# Patient Record
Sex: FEMALE | Race: WHITE
[De-identification: names, ages, dates, MRNs, and addresses within clinical notes are randomized per-mention and may not be internally consistent; named-entity substitution may affect disease eponyms.]

---

## 2017-06-14 ENCOUNTER — HOSPITAL ENCOUNTER (OUTPATIENT)
Dept: HOSPITAL 10 - HKI | Age: 82
Discharge: HOME | End: 2017-06-14
Attending: ORTHOPAEDIC SURGERY
Payer: MEDICARE

## 2017-06-14 DIAGNOSIS — M16.12: Primary | ICD-10-CM

## 2017-06-14 PROCEDURE — G0463 HOSPITAL OUTPT CLINIC VISIT: HCPCS

## 2017-06-14 PROCEDURE — 20610 DRAIN/INJ JOINT/BURSA W/O US: CPT

## 2017-06-14 NOTE — HKNOTE
DATE OF SERVICE:  06/14/2017

 

 

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  She has previously had bilateral knee 
replacements.  The pain in her left groin is "now quite severe."  She has to advance to "events" to 
attend, 1 in July and 1 in September.  She requests cortisone injections.

 

PHYSICAL EXAMINATION:

GENERAL:  The patient is a fit-looking 88-year-old female.

VITAL SIGNS:  Height is 5 feet 7 inches, weight 168 pounds, blood pressure 180/90, temperature 98.3.

LEFT HIP:  Marked limitation of movement of the left hip on account of groin pain.

 

IMAGING:  X-rays of the left hip obtained today at the [facility] were reviewed. 
 These show marked narrowing of the left hip joint space.

 

MANAGEMENT:  The patient states that she knows she is going to need to have a hip replacement operat
ion, but she has "2 events to go to in the next 2 months.  She would like to have a cortisone inject
ion instead."

 

MANAGEMENT:  Under sterile conditions, the patient was given injection of 2 mL of Kenalog and 6 mL o
f 2% lidocaine into the left hip joint space.  She had immediate and complete relief of her groin pa
in.  She will be seen again in just before she goes on her next "event trip."

 

 

Dictated By: DOREEN MEZA/OLGA

DD:    06/14/2017 17:55:51

DT:    06/14/2017 22:26:00

Conf#: [id number]

DID#:  [id number]

## 2017-08-29 ENCOUNTER — HOSPITAL ENCOUNTER (OUTPATIENT)
Dept: HOSPITAL 10 - HKI | Age: 82
Discharge: HOME | End: 2017-08-29
Attending: ORTHOPAEDIC SURGERY
Payer: MEDICARE

## 2017-08-29 DIAGNOSIS — M13.852: Primary | ICD-10-CM

## 2017-08-29 PROCEDURE — 20610 DRAIN/INJ JOINT/BURSA W/O US: CPT

## 2017-08-29 PROCEDURE — G0463 HOSPITAL OUTPT CLINIC VISIT: HCPCS

## 2017-10-04 NOTE — HKNOTE
DATE OF SERVICE:  08/29/2017

 

MAIN COMPLAINT:  The patient was last seen by me on June 14.  She

was diagnosed as having severe arthritis of her left hip.  She

was given a cortisone injection into the hip joint.

 

The cortisone injection gave her a great deal of relief.  She

requests that I give her a repeat injection because she is not

ready to consider hip replacement yet at this time.

 

PHYSICAL EXAMINATION:  Physical examination today is unchanged

since the last visit.

VITAL SIGNS:  Blood pressure is 160/70, temperature 98.5.

 

PROCEDURE:  Under sterile conditions, she was given an injection

of 2 cc of Kenalog with 6 cc of 2 percent lidocaine into the left

hip joint, and she will be seen again as necessary.

 

 

 

Dictated By:  Lonny Kimble MD

 

/venita/nato

Job#:  [id number]/Document#:  [id number]

D:  10/03/2017 23:40

T:  10/04/2017 03:51

## 2017-12-19 ENCOUNTER — HOSPITAL ENCOUNTER (OUTPATIENT)
Dept: HOSPITAL 10 - HKI | Age: 82
Discharge: HOME | End: 2017-12-19
Attending: ORTHOPAEDIC SURGERY
Payer: MEDICARE

## 2017-12-19 DIAGNOSIS — M16.12: Primary | ICD-10-CM

## 2017-12-19 PROCEDURE — 20610 DRAIN/INJ JOINT/BURSA W/O US: CPT

## 2017-12-19 PROCEDURE — G0463 HOSPITAL OUTPT CLINIC VISIT: HCPCS

## 2017-12-20 NOTE — HKNOTE
DATE OF SERVICE:  12/19/2017

 

 

HISTORY OF PRESENT ILLNESS:  The patient has degenerative osteoarthritis of her left hip.  She is a 
candidate for left hip replacement.  She comes in requesting repeat cortisone injection into the hip
.  She is not yet ready to have surgery on the hip.  She gets great relief from the cortisone inject
ions and she is requesting repeat injection.

 

PHYSICAL EXAMINATION:  There is quite marked stiffness of the left hip, with quite severe pain at th
e limits of motion.

 

IMAGING:  The last x-rays of the left hip shows severe arthritis.

 

MANAGEMENT:  Under sterile conditions, the patient was given injection of 2 mL of Kenalog and 10 mL 
of 2% lidocaine into the hip joint.  Note that the pain relief was not as complete I would expect fr
om the anesthetic 5 mL of Kenalog, so a further 1 mL was injected into the hip joint, and this time 
there was complete relief of the pain.  She will be seen again as necessary for further treatment.

 

 

Dictated By: DOREEN MEZA/OLGA

DD:    12/19/2017 18:04:52

DT:    12/20/2017 10:26:38

Conf#: [id number]

DID#:  [id number]

## 2018-07-20 ENCOUNTER — HOSPITAL ENCOUNTER (OUTPATIENT)
Dept: HOSPITAL 91 - HKI | Age: 83
Discharge: HOME | End: 2018-07-20
Payer: MEDICARE

## 2018-07-20 ENCOUNTER — HOSPITAL ENCOUNTER (OUTPATIENT)
Age: 83
Discharge: HOME | End: 2018-07-20

## 2018-07-20 DIAGNOSIS — M16.12: Primary | ICD-10-CM

## 2018-09-24 ENCOUNTER — HOSPITAL ENCOUNTER (OUTPATIENT)
Age: 83
Discharge: HOME | End: 2018-09-24

## 2018-09-24 ENCOUNTER — HOSPITAL ENCOUNTER (OUTPATIENT)
Dept: HOSPITAL 91 - HKI | Age: 83
Discharge: HOME | End: 2018-09-24
Payer: MEDICARE

## 2018-09-24 DIAGNOSIS — M25.552: Primary | ICD-10-CM

## 2018-09-24 DIAGNOSIS — M16.12: ICD-10-CM

## 2018-09-24 PROCEDURE — 73502 X-RAY EXAM HIP UNI 2-3 VIEWS: CPT

## 2018-10-01 ENCOUNTER — HOSPITAL ENCOUNTER (OUTPATIENT)
Dept: HOSPITAL 91 - HKI | Age: 83
Discharge: HOME | End: 2018-10-01
Payer: MEDICARE

## 2018-10-01 ENCOUNTER — HOSPITAL ENCOUNTER (OUTPATIENT)
Age: 83
Discharge: HOME | End: 2018-10-01

## 2018-10-01 DIAGNOSIS — Z01.818: Primary | ICD-10-CM

## 2018-10-01 PROCEDURE — 87081 CULTURE SCREEN ONLY: CPT

## 2018-10-11 ENCOUNTER — HOSPITAL ENCOUNTER (INPATIENT)
Age: 83
LOS: 3 days | Discharge: HOME HEALTH | DRG: 470 | End: 2018-10-14

## 2018-10-22 ENCOUNTER — HOSPITAL ENCOUNTER (OUTPATIENT)
Dept: HOSPITAL 91 - HKI | Age: 83
Discharge: HOME | End: 2018-10-22
Payer: MEDICARE

## 2018-10-22 ENCOUNTER — HOSPITAL ENCOUNTER (OUTPATIENT)
Age: 83
Discharge: HOME | End: 2018-10-22

## 2018-10-22 DIAGNOSIS — Z47.1: Primary | ICD-10-CM

## 2018-10-22 DIAGNOSIS — Z96.652: ICD-10-CM

## 2018-10-22 PROCEDURE — 73502 X-RAY EXAM HIP UNI 2-3 VIEWS: CPT

## 2018-11-12 ENCOUNTER — HOSPITAL ENCOUNTER (OUTPATIENT)
Dept: HOSPITAL 91 - HKI | Age: 83
Discharge: HOME | End: 2018-11-12
Payer: MEDICARE

## 2018-11-12 ENCOUNTER — HOSPITAL ENCOUNTER (OUTPATIENT)
Age: 83
Discharge: HOME | End: 2018-11-12

## 2018-11-12 DIAGNOSIS — Z96.642: ICD-10-CM

## 2018-11-12 DIAGNOSIS — Z47.1: Primary | ICD-10-CM

## 2018-11-12 PROCEDURE — 73502 X-RAY EXAM HIP UNI 2-3 VIEWS: CPT
